# Patient Record
Sex: FEMALE | Race: WHITE | NOT HISPANIC OR LATINO | RURAL
[De-identification: names, ages, dates, MRNs, and addresses within clinical notes are randomized per-mention and may not be internally consistent; named-entity substitution may affect disease eponyms.]

---

## 2017-09-18 ENCOUNTER — OFFICE VISIT (OUTPATIENT)
Dept: OBSTETRICS AND GYNECOLOGY | Facility: CLINIC | Age: 37
End: 2017-09-18

## 2017-09-18 VITALS
BODY MASS INDEX: 22.33 KG/M2 | HEIGHT: 70 IN | SYSTOLIC BLOOD PRESSURE: 118 MMHG | DIASTOLIC BLOOD PRESSURE: 80 MMHG | WEIGHT: 156 LBS

## 2017-09-18 DIAGNOSIS — Z30.41 ENCOUNTER FOR SURVEILLANCE OF CONTRACEPTIVE PILLS: ICD-10-CM

## 2017-09-18 DIAGNOSIS — Z01.419 ENCOUNTER FOR ANNUAL ROUTINE GYNECOLOGICAL EXAMINATION: Primary | ICD-10-CM

## 2017-09-18 PROCEDURE — 88142 CYTOPATH C/V THIN LAYER: CPT | Performed by: OBSTETRICS & GYNECOLOGY

## 2017-09-18 PROCEDURE — 88141 CYTOPATH C/V INTERPRET: CPT | Performed by: PATHOLOGY

## 2017-09-18 PROCEDURE — 99385 PREV VISIT NEW AGE 18-39: CPT | Performed by: OBSTETRICS & GYNECOLOGY

## 2017-09-18 PROCEDURE — 87624 HPV HI-RISK TYP POOLED RSLT: CPT | Performed by: OBSTETRICS & GYNECOLOGY

## 2017-09-18 RX ORDER — CETIRIZINE HYDROCHLORIDE 10 MG/1
10 TABLET ORAL DAILY
COMMUNITY
End: 2018-10-16 | Stop reason: HOSPADM

## 2017-09-18 NOTE — PROGRESS NOTES
Ceci Wall is a 37 y.o. y/o female     Chief Complaint: Establish care, annual GYN exam and Pap smear, refill birth control pills.    HPI: 37-year-old  with one prior vaginal delivery.  She is here to establish care, to have her annual GYN exam and Pap smear, and to have her birth control pill prescription refilled.  She does not have any GYN complaints.  She is a nonsmoker.    She has lots of animals including pigmy goats.    She had breast implants placed in around the year .  She had them replaced 5 years later.  These do not cause her any problems.    Review of Systems   Constitutional: Negative for activity change, appetite change, chills, diaphoresis, fatigue, fever and unexpected weight change.   Gastrointestinal: Negative for abdominal pain, constipation, diarrhea and nausea.   Genitourinary: Negative for difficulty urinating, dyspareunia, dysuria, menstrual problem, pelvic pain, urgency, vaginal bleeding, vaginal discharge and vaginal pain.   Neurological: Negative for headaches.   Psychiatric/Behavioral: Negative for dysphoric mood. The patient is not nervous/anxious.    All other systems reviewed and are negative.     Breast ROS: negative    The following portions of the patient's history were reviewed and updated as appropriate: allergies, current medications, past family history, past medical history, past social history, past surgical history and problem list.    No Known Allergies       Current Outpatient Prescriptions:   •  cetirizine (zyrTEC) 10 MG tablet, Take 10 mg by mouth Daily., Disp: , Rfl:   •  RECLIPSEN 0.15-30 MG-MCG per tablet, Take 1 tablet by mouth Daily., Disp: 84 tablet, Rfl: 3     The patient has a family history of   Family History   Problem Relation Age of Onset   • Melanoma Father    • Hodgkin's lymphoma Mother         Past Medical History:   Diagnosis Date   • Abnormal Pap smear of cervix    • Asthma         OB History      Para Term  AB TAB SAB  "Ectopic Multiple Living    1 1 1       1           Social History     Social History   • Marital status: Unknown     Spouse name: N/A   • Number of children: N/A   • Years of education: N/A     Occupational History   • Not on file.     Social History Main Topics   • Smoking status: Never Smoker   • Smokeless tobacco: Never Used   • Alcohol use No   • Drug use: No   • Sexual activity: Not on file     Other Topics Concern   • Not on file     Social History Narrative   • No narrative on file        Past Surgical History:   Procedure Laterality Date   • BREAST AUGMENTATION  2000 and 2005        There is no problem list on file for this patient.       Documented Vitals    09/18/17 1419   BP: 118/80   Weight: 156 lb (70.8 kg)   Height: 70\" (177.8 cm)   PainSc: 0-No pain       Physical Exam   Constitutional: She is oriented to person, place, and time. She appears well-developed and well-nourished. No distress.   Well-developed well-nourished white female weighing 156 pounds with BMI 22.4   HENT:   Head: Normocephalic and atraumatic.   Eyes: Conjunctivae and EOM are normal. Pupils are equal, round, and reactive to light.   Neck: Normal range of motion. Neck supple. No JVD present. No tracheal deviation present. No thyromegaly present.   Cardiovascular: Normal rate, regular rhythm, normal heart sounds and intact distal pulses.  Exam reveals no gallop and no friction rub.    No murmur heard.  Pulmonary/Chest: Effort normal and breath sounds normal. No stridor. No respiratory distress. She has no wheezes. She has no rales. She exhibits no tenderness. Right breast exhibits no inverted nipple, no mass, no nipple discharge, no skin change and no tenderness. Left breast exhibits no inverted nipple, no mass, no nipple discharge, no skin change and no tenderness. Breasts are symmetrical. There is no breast swelling.   Bilateral breast implants.   Abdominal: Soft. Bowel sounds are normal. She exhibits no distension and no mass. There " is no tenderness. There is no rebound and no guarding. No hernia. Hernia confirmed negative in the right inguinal area and confirmed negative in the left inguinal area.   Genitourinary: Vagina normal and uterus normal. Rectal exam shows no external hemorrhoid. No breast tenderness, discharge or bleeding. No labial fusion. There is no rash, tenderness, lesion or injury on the right labia. There is no rash, tenderness, lesion or injury on the left labia. Uterus is not deviated, not enlarged, not fixed and not tender. Cervix exhibits no motion tenderness, no discharge and no friability. Right adnexum displays no mass, no tenderness and no fullness. Left adnexum displays no mass, no tenderness and no fullness. No vaginal discharge found.   Genitourinary Comments: Pap smear of the cervix performed.  Urethral meatus without erythema or prolapse.   Musculoskeletal: Normal range of motion. She exhibits no edema, tenderness or deformity.   Lymphadenopathy:     She has no cervical adenopathy.        Right: No inguinal adenopathy present.        Left: No inguinal adenopathy present.   Neurological: She is alert and oriented to person, place, and time. She has normal reflexes. She displays normal reflexes. No cranial nerve deficit. She exhibits normal muscle tone. Coordination normal.   Skin: Skin is warm and dry. No rash noted. She is not diaphoretic. No erythema. No pallor.   Psychiatric: She has a normal mood and affect. Her behavior is normal. Judgment and thought content normal.   Nursing note and vitals reviewed.       Assessment        Diagnosis Plan   1. Encounter for annual routine gynecological examination  Liquid-based Pap Smear, Screening   2. Encounter for surveillance of contraceptive pills           Plan      1. Refilled birth control pills ×1 year.  2. Recommended vitamin D3 and B12 supplementation.  3. Recommend mammograms starting next year.  4. Encouraged in diet and exercise.  5. Handouts on depression, hot  flashes, exercise, and vitamin use.   6. Follow-up in 1 year.  Follow-up sooner as needed.            This document has been electronically signed by Ej Brown MD on September 18, 2017 3:13 PM

## 2017-09-27 LAB
LAB AP CASE REPORT: NORMAL
LAB AP GYN ADDITIONAL INFORMATION: NORMAL
LAB AP GYN OTHER FINDINGS: NORMAL
Lab: NORMAL
PATH INTERP SPEC-IMP: NORMAL
STAT OF ADQ CVX/VAG CYTO-IMP: NORMAL

## 2017-10-02 LAB — HPV I/H RISK 4 DNA CVX QL PROBE+SIG AMP: NEGATIVE

## 2018-03-13 ENCOUNTER — OFFICE VISIT CONVERTED (OUTPATIENT)
Dept: OTHER | Facility: HOSPITAL | Age: 38
End: 2018-03-13
Attending: INTERNAL MEDICINE

## 2018-04-24 ENCOUNTER — OFFICE VISIT CONVERTED (OUTPATIENT)
Dept: OTHER | Facility: HOSPITAL | Age: 38
End: 2018-04-24
Attending: INTERNAL MEDICINE

## 2018-10-16 ENCOUNTER — OFFICE VISIT (OUTPATIENT)
Dept: OBSTETRICS AND GYNECOLOGY | Facility: CLINIC | Age: 38
End: 2018-10-16

## 2018-10-16 VITALS
WEIGHT: 157.2 LBS | BODY MASS INDEX: 22.5 KG/M2 | HEIGHT: 70 IN | SYSTOLIC BLOOD PRESSURE: 110 MMHG | DIASTOLIC BLOOD PRESSURE: 80 MMHG

## 2018-10-16 DIAGNOSIS — Z30.41 ENCOUNTER FOR SURVEILLANCE OF CONTRACEPTIVE PILLS: ICD-10-CM

## 2018-10-16 DIAGNOSIS — Z12.31 ENCOUNTER FOR SCREENING MAMMOGRAM FOR BREAST CANCER: ICD-10-CM

## 2018-10-16 DIAGNOSIS — F40.298: Primary | ICD-10-CM

## 2018-10-16 DIAGNOSIS — Z98.82 PRESENCE OF BILATERAL BREAST IMPLANT: ICD-10-CM

## 2018-10-16 PROCEDURE — 99213 OFFICE O/P EST LOW 20 MIN: CPT | Performed by: OBSTETRICS & GYNECOLOGY

## 2018-10-16 RX ORDER — DESOGESTREL AND ETHINYL ESTRADIOL 0.15-0.03
1 KIT ORAL DAILY
Qty: 84 TABLET | Refills: 3 | Status: SHIPPED | OUTPATIENT
Start: 2018-10-16 | End: 2018-11-16 | Stop reason: SDUPTHER

## 2018-10-16 NOTE — PROGRESS NOTES
Ceci Wall is a 38 y.o. y/o female     Chief Complaint: Abnormal PET scan, needs first mammogram, refill birth control pills.    HPI: 38-year-old  with one prior vaginal delivery.     Previous CT scan noted to pulmonary nodules.  This was followed up with a PET scan which was performed 2018 showed an increased radiopharmaceutical activity within the endometrium was seen.  This could be physiologic.  She started her period that day.     She does not have any GYN complaints.      She is a nonsmoker.    She has lots of animals including pigmy goats.    She had breast implants placed in around the year .  She had them replaced 5 years later.  These do not cause her any problems.    Pap smear 2017 was negative for intraepithelial lesion or malignancy and negative for high risk HPV.    2018, we discussed the results of her PET scan in detail.  No further workup needs to be done until she begins to have GYN problems.  I refilled her birth control pills.  We will get her scheduled for mammogram.    Review of Systems   Constitutional: Negative for activity change, appetite change, chills, diaphoresis, fatigue, fever and unexpected weight change.   Gastrointestinal: Negative for abdominal pain, constipation, diarrhea and nausea.   Genitourinary: Negative for difficulty urinating, dyspareunia, dysuria, menstrual problem, pelvic pain, urgency, vaginal bleeding, vaginal discharge and vaginal pain.   Neurological: Negative for headaches.   Psychiatric/Behavioral: Negative for dysphoric mood. The patient is not nervous/anxious.    All other systems reviewed and are negative.     Breast ROS: negative    The following portions of the patient's history were reviewed and updated as appropriate: allergies, current medications, past family history, past medical history, past social history, past surgical history and problem list.    No Known Allergies       Current Outpatient  "Prescriptions:   •  desogestrel-ethinyl estradiol (RECLIPSEN) 0.15-30 MG-MCG per tablet, Take 1 tablet by mouth Daily., Disp: 84 tablet, Rfl: 3     The patient has a family history of   Family History   Problem Relation Age of Onset   • Melanoma Father    • Hodgkin's lymphoma Mother         Past Medical History:   Diagnosis Date   • Asthma    • Fear of developing genital cancer 10/16/2018        OB History      Para Term  AB Living    1 1 1     1    SAB TAB Ectopic Molar Multiple Live Births              1           Social History     Social History   • Marital status: Unknown     Spouse name: N/A   • Number of children: N/A   • Years of education: N/A     Occupational History   • Not on file.     Social History Main Topics   • Smoking status: Never Smoker   • Smokeless tobacco: Never Used   • Alcohol use No   • Drug use: No   • Sexual activity: Not on file     Other Topics Concern   • Not on file     Social History Narrative   • No narrative on file        Past Surgical History:   Procedure Laterality Date   • BREAST AUGMENTATION   and         Patient Active Problem List   Diagnosis   • Presence of bilateral breast implant   • Fear of developing genital cancer        Documented Vitals    10/16/18 1631   BP: 110/80   Weight: 71.3 kg (157 lb 3.2 oz)   Height: 177.8 cm (70\")       Physical Exam   Constitutional: She is oriented to person, place, and time. She appears well-developed and well-nourished. No distress.   Well-developed well-nourished white female weighing 257.2 pounds with BMI 22.6.  Blood pressure 110/80.  Pulse 76.   HENT:   Head: Normocephalic and atraumatic.   Eyes: Pupils are equal, round, and reactive to light. Conjunctivae and EOM are normal.   Neck: Normal range of motion. Neck supple. No JVD present. No tracheal deviation present. No thyromegaly present.   Cardiovascular: Normal rate, regular rhythm, normal heart sounds and intact distal pulses.  Exam reveals no gallop and no " friction rub.    No murmur heard.  Pulmonary/Chest: Effort normal and breath sounds normal. No stridor. No respiratory distress. She has no wheezes. She has no rales. She exhibits no tenderness.   Abdominal: Soft. Bowel sounds are normal. She exhibits no distension and no mass. There is no tenderness. There is no rebound and no guarding. No hernia.   Musculoskeletal: Normal range of motion. She exhibits no edema, tenderness or deformity.   Lymphadenopathy:     She has no cervical adenopathy.   Neurological: She is alert and oriented to person, place, and time. She has normal reflexes. She displays normal reflexes. No cranial nerve deficit. She exhibits normal muscle tone. Coordination normal.   Skin: Skin is warm and dry. No rash noted. She is not diaphoretic. No erythema. No pallor.   Psychiatric: She has a normal mood and affect. Her behavior is normal. Judgment and thought content normal.   Nursing note and vitals reviewed.       Assessment        Diagnosis Plan   1. Fear of developing genital cancer     2. Encounter for surveillance of contraceptive pills     3. Presence of bilateral breast implant     4. Encounter for screening mammogram for breast cancer  Mammo Screening Digital Tomosynthesis Bilateral With CAD         Plan      1. Refilled birth control pills ×1 year.  2. Recommended vitamin D3 and B12 supplementation.  3. Scheduled mammogram.  4. Encouraged in diet and exercise.  5. Handouts on depression, hot flashes, exercise, and vitamin use.   6. Follow-up in 1 year.  Follow-up sooner as needed.            This document has been electronically signed by Ej Brown MD on October 16, 2018 6:27 PM

## 2018-11-16 ENCOUNTER — TELEPHONE (OUTPATIENT)
Dept: OBSTETRICS AND GYNECOLOGY | Facility: CLINIC | Age: 38
End: 2018-11-16

## 2018-11-16 RX ORDER — DESOGESTREL AND ETHINYL ESTRADIOL 0.15-0.03
1 KIT ORAL DAILY
Qty: 84 TABLET | Refills: 3 | Status: SHIPPED | OUTPATIENT
Start: 2018-11-16 | End: 2018-11-16 | Stop reason: SDUPTHER

## 2018-11-16 RX ORDER — DESOGESTREL AND ETHINYL ESTRADIOL 0.15-0.03
1 KIT ORAL DAILY
Qty: 84 TABLET | Refills: 3 | Status: SHIPPED | OUTPATIENT
Start: 2018-11-16

## 2018-11-16 NOTE — TELEPHONE ENCOUNTER
----- Message from Kiana Lyndl Marina sent at 11/16/2018  9:22 AM CST -----  Contact: [phone]  Pt said that she still has not received her BC prescription from [facility]. Has never used this company before and she said that she is now a week behind on medicine.       Called and notified the pt that [facility].  The reason that her medication wasn't filled is because it was too early.  They are sending it out to her now.  I also informed her that I called in a 1 month rx to [facility] in [place] to get her through until it comes in the mail, but that the pt will have to pay for it out of pocket.  The pt verbalized understanding.

## 2021-05-28 VITALS
BODY MASS INDEX: 22.55 KG/M2 | WEIGHT: 157.5 LBS | DIASTOLIC BLOOD PRESSURE: 69 MMHG | OXYGEN SATURATION: 98 % | HEART RATE: 89 BPM | HEIGHT: 70 IN | SYSTOLIC BLOOD PRESSURE: 136 MMHG | RESPIRATION RATE: 10 BRPM | TEMPERATURE: 98.5 F

## 2021-05-28 VITALS
OXYGEN SATURATION: 98 % | RESPIRATION RATE: 12 BRPM | HEIGHT: 70 IN | DIASTOLIC BLOOD PRESSURE: 68 MMHG | BODY MASS INDEX: 22.03 KG/M2 | SYSTOLIC BLOOD PRESSURE: 108 MMHG | HEART RATE: 77 BPM | TEMPERATURE: 99.2 F | WEIGHT: 153.88 LBS
